# Patient Record
Sex: MALE | Race: OTHER | Employment: FULL TIME | URBAN - METROPOLITAN AREA
[De-identification: names, ages, dates, MRNs, and addresses within clinical notes are randomized per-mention and may not be internally consistent; named-entity substitution may affect disease eponyms.]

---

## 2021-12-16 ENCOUNTER — OFFICE VISIT (OUTPATIENT)
Dept: FAMILY MEDICINE CLINIC | Age: 41
End: 2021-12-16
Payer: COMMERCIAL

## 2021-12-16 VITALS
OXYGEN SATURATION: 99 % | DIASTOLIC BLOOD PRESSURE: 72 MMHG | HEART RATE: 75 BPM | WEIGHT: 189.38 LBS | SYSTOLIC BLOOD PRESSURE: 120 MMHG | RESPIRATION RATE: 18 BRPM | TEMPERATURE: 98.6 F | BODY MASS INDEX: 28.05 KG/M2 | HEIGHT: 69 IN

## 2021-12-16 DIAGNOSIS — Z11.59 ENCOUNTER FOR HEPATITIS C SCREENING TEST FOR LOW RISK PATIENT: ICD-10-CM

## 2021-12-16 DIAGNOSIS — Z13.29 SCREENING FOR THYROID DISORDER: ICD-10-CM

## 2021-12-16 DIAGNOSIS — Z13.6 ENCOUNTER FOR LIPID SCREENING FOR CARDIOVASCULAR DISEASE: ICD-10-CM

## 2021-12-16 DIAGNOSIS — Z13.220 ENCOUNTER FOR LIPID SCREENING FOR CARDIOVASCULAR DISEASE: ICD-10-CM

## 2021-12-16 DIAGNOSIS — R43.1 ABNORMAL SMELL: Primary | ICD-10-CM

## 2021-12-16 DIAGNOSIS — H57.89 IRRITATION OF BOTH EYES: ICD-10-CM

## 2021-12-16 PROCEDURE — 99204 OFFICE O/P NEW MOD 45 MIN: CPT | Performed by: STUDENT IN AN ORGANIZED HEALTH CARE EDUCATION/TRAINING PROGRAM

## 2021-12-16 NOTE — PROGRESS NOTES
Orlando Salgado, 46 y.o.,  male  Chief Complaint   Patient presents with    New Patient    Other      wants to phanttosmia has concerns about smelling like smoke (patient does not smoke, does not hang around people that smoke)        SUBJECTIVE  History of present illness  1. Abnormal smell  Occasionally smells smoky odor x for 1 year. No significant past medical history. Denies nasal and paranasal sinus disease, head or brain injury, endocrine disorders (hypothyroidism, diabetes mellitus), central nervous system disorders. Works as a . Reports excessive daily use of antiseptic air spray (Lysol) during COVID-19 pandemic. 2. Eyes irritation   Dry eyes and eye irritation for 1 year. Has tried nothing. ROS:  Pertinent items are noted in HPI    OBJECTIVE    Physical Exam:     Visit Vitals  /72 (BP 1 Location: Left arm, BP Patient Position: Sitting, BP Cuff Size: Large adult)   Pulse 75   Temp 98.6 °F (37 °C) (Temporal)   Resp 18   Ht 5' 9\" (1.753 m)   Wt 189 lb 6 oz (85.9 kg)   SpO2 99%   BMI 27.97 kg/m²       General: alert, well-appearing, in no apparent distress or pain  Head: atraumatic.  Non-tender maxillary and frontal sinuses  Eyes: Lids with no discharge, no matting, conjunctivae clear and non injected, full EOMs, PERLLA  Ears: pinna non-tender, external auditory canal patent, TM intact  Mouth/throat: teeth, gums, palate normal appearing, moist oral mucosa, tonsils non enlarged, pharynx non erythematous and no lesion  Neck: supple, no JVD , no carotid bruit, no adenopathy palpated  CVS: normal rate, regular rhythm, distinct S1 and S2, no murmurs, rubs clicks or gallops  Lungs: Breathing not labored, good chest excursion, clear to ausculation bilaterally, no crackles, wheezing or rhonchi noted  Abdomen: normoactive bowel sounds, soft, non-tender, no organomegaly  Extremities: no edema, no cyanosis,  Skin: warm, no lesions, rashes noted  Psych: alert and oriented x3, mood, insight, speech and affect normal    ASSESSMENT/PLAN  Diagnoses and all orders for this visit:    1. Abnormal smell  -     METABOLIC PANEL, COMPREHENSIVE; Future  -     CBC WITH AUTOMATED DIFF; Future  Etiology unclear. No abnormal findings on physical examination. No significant past medical history. Labs ordered. Consider rule out endocrine disorders (hypothyroidism, diabetes mellitus). Can be related to chemical exposure (excessive use of air spray). Patient advised to reduce use of Lysol spray for 1 month and monitor symptoms. 2. Screening for thyroid disorder  -     TSH 3RD GENERATION; Future  3. Encounter for lipid screening for cardiovascular disease  -     LIPID PANEL; Future  4. Encounter for hepatitis C screening test for low risk patient  -     HEPATITIS C AB; Future  5. Irritation of both eyes  No abnormal findings on physical examination. OTC artificial tears recommended. Follow-up and Dispositions    · Return in about 6 weeks (around 1/27/2022) for routine care.

## 2021-12-16 NOTE — PATIENT INSTRUCTIONS
Comprehensive Metabolic Panel: About This Test  What is it? This blood test measures your sugar (glucose) level and electrolyte and fluid balance. It also measures kidney and liver function. Why is this test done? Your doctor may order this test as part of a regular exam. The doctor may use this test to check on a condition such as high blood pressure. It also can help diagnose problems such as diabetes. How do you prepare for the test?  Your doctor may ask you not to eat or drink anything other than water for at least 8 hours before the test.  How is the test done? A health professional uses a needle to take a blood sample, usually from the arm. How long does the test take? The test will take a few minutes. What happens after the test?  · You will probably be able to go home right away. · You can go back to your usual activities right away. Follow-up care is a key part of your treatment and safety. Be sure to make and go to all appointments, and call your doctor if you are having problems. It's also a good idea to keep a list of the medicines you take. Ask your doctor when you can expect to have your test results. Current as of: June 17, 2021               Content Version: 13.0  © 2006-2021 Healthwise, Incorporated. Care instructions adapted under license by [employer] (which disclaims liability or warranty for this information). If you have questions about a medical condition or this instruction, always ask your healthcare professional. [place] [address] any warranty or liability for your use of this information. A Healthy Lifestyle: Care Instructions  Your Care Instructions     A healthy lifestyle can help you feel good, stay at a healthy weight, and have plenty of energy for both work and play. A healthy lifestyle is something you can share with your whole family.   A healthy lifestyle also can lower your risk for serious health problems, such as high blood pressure, heart disease, and diabetes. You can follow a few steps listed below to improve your health and the health of your family. Follow-up care is a key part of your treatment and safety. Be sure to make and go to all appointments, and call your doctor if you are having problems. It's also a good idea to know your test results and keep a list of the medicines you take. How can you care for yourself at home? · Do not eat too much sugar, fat, or fast foods. You can still have dessert and treats now and then. The goal is moderation. · Start small to improve your eating habits. Pay attention to portion sizes, drink less juice and soda pop, and eat more fruits and vegetables. ? Eat a healthy amount of food. A 3-ounce serving of meat, for example, is about the size of a deck of cards. Fill the rest of your plate with vegetables and whole grains. ? Limit the amount of soda and sports drinks you have every day. Drink more water when you are thirsty. ? Eat plenty of fruits and vegetables every day. Have an apple or some carrot sticks as an afternoon snack instead of a candy bar. Try to have fruits and/or vegetables at every meal.  · Make exercise part of your daily routine. You may want to start with simple activities, such as walking, bicycling, or slow swimming. Try to be active 30 to 60 minutes every day. You do not need to do all 30 to 60 minutes all at once. For example, you can exercise 3 times a day for 10 or 20 minutes. Moderate exercise is safe for most people, but it is always a good idea to talk to your doctor before starting an exercise program.  · Keep moving. [facility] the lawn, work in the garden, or [employer]. Take the stairs instead of the elevator at work. · If you smoke, quit. People who smoke have an increased risk for heart attack, stroke, cancer, and other lung illnesses. Quitting is hard, but there are ways to boost your chance of quitting tobacco for good. ?  Use nicotine gum, patches, or lozenges. ? Ask your doctor about stop-smoking programs and medicines. ? Keep trying. In addition to reducing your risk of diseases in the future, you will notice some benefits soon after you stop using tobacco. If you have shortness of breath or asthma symptoms, they will likely get better within a few weeks after you quit. · Limit how much alcohol you drink. Moderate amounts of alcohol (up to 2 drinks a day for men, 1 drink a day for women) are okay. But drinking too much can lead to liver problems, high blood pressure, and other health problems. Family health  If you have a family, there are many things you can do together to improve your health. · Eat meals together as a family as often as possible. · Eat healthy foods. This includes fruits, vegetables, lean meats and dairy, and whole grains. · Include your family in your fitness plan. Most people think of activities such as jogging or tennis as the way to fitness, but there are many ways you and your family can be more active. Anything that makes you breathe hard and gets your heart pumping is exercise. Here are some tips:  ? Walk to do errands or to take your child to school or the bus.  ? Go for a family bike ride after dinner instead of watching TV. Where can you learn more? Go to http://www.gray.com/  Enter G417 in the search box to learn more about \"A Healthy Lifestyle: Care Instructions. \"  Current as of: June 16, 2021               Content Version: 13.0  © 4975-9957 Healthwise, [employer]. Care instructions adapted under license by [employer] (which disclaims liability or warranty for this information). If you have questions about a medical condition or this instruction, always ask your healthcare professional. [place] [address] any warranty or liability for your use of this information.

## 2022-02-02 ENCOUNTER — HOSPITAL ENCOUNTER (OUTPATIENT)
Dept: LAB | Age: 42
Discharge: HOME | End: 2022-02-02

## 2022-02-02 LAB — XX-LABCORP SPECIMEN COL,LCBCF: NORMAL

## 2022-02-02 PROCEDURE — 99001 SPECIMEN HANDLING PT-LAB: CPT

## 2022-02-03 LAB
ALBUMIN SERPL-MCNC: 4.2 G/DL (ref 4–5)
ALBUMIN/GLOB SERPL: 1.6 {RATIO} (ref 1.2–2.2)
ALP SERPL-CCNC: 68 IU/L (ref 44–121)
ALT SERPL-CCNC: 16 IU/L (ref 0–44)
AST SERPL-CCNC: 16 IU/L (ref 0–40)
BASOPHILS # BLD AUTO: 0 X10E3/UL (ref 0–0.2)
BASOPHILS NFR BLD AUTO: 1 %
BILIRUB SERPL-MCNC: 0.2 MG/DL (ref 0–1.2)
BUN SERPL-MCNC: 10 MG/DL (ref 6–20)
BUN/CREAT SERPL: 9 (ref 9–20)
CALCIUM SERPL-MCNC: 9.4 MG/DL (ref 8.7–10.2)
CHLORIDE SERPL-SCNC: 104 MMOL/L (ref 96–106)
CHOLEST SERPL-MCNC: 205 MG/DL (ref 100–199)
CO2 SERPL-SCNC: 22 MMOL/L (ref 20–29)
CREAT SERPL-MCNC: 1.1 MG/DL (ref 0.76–1.27)
EOSINOPHIL # BLD AUTO: 0.1 X10E3/UL (ref 0–0.4)
EOSINOPHIL NFR BLD AUTO: 2 %
ERYTHROCYTE [DISTWIDTH] IN BLOOD BY AUTOMATED COUNT: 12.4 % (ref 11.6–15.4)
GLOBULIN SER CALC-MCNC: 2.7 G/DL (ref 1.5–4.5)
GLUCOSE SERPL-MCNC: 95 MG/DL (ref 65–99)
HCT VFR BLD AUTO: 45.1 % (ref 37.5–51)
HCV AB S/CO SERPL IA: <0.1 S/CO RATIO (ref 0–0.9)
HDLC SERPL-MCNC: 52 MG/DL
HGB BLD-MCNC: 15.1 G/DL (ref 13–17.7)
IMM GRANULOCYTES # BLD AUTO: 0 X10E3/UL (ref 0–0.1)
IMM GRANULOCYTES NFR BLD AUTO: 0 %
IMP & REVIEW OF LAB RESULTS: NORMAL
LDLC SERPL CALC-MCNC: 141 MG/DL (ref 0–99)
LYMPHOCYTES # BLD AUTO: 2.4 X10E3/UL (ref 0.7–3.1)
LYMPHOCYTES NFR BLD AUTO: 46 %
MCH RBC QN AUTO: 29.8 PG (ref 26.6–33)
MCHC RBC AUTO-ENTMCNC: 33.5 G/DL (ref 31.5–35.7)
MCV RBC AUTO: 89 FL (ref 79–97)
MONOCYTES # BLD AUTO: 0.4 X10E3/UL (ref 0.1–0.9)
MONOCYTES NFR BLD AUTO: 8 %
NEUTROPHILS # BLD AUTO: 2.3 X10E3/UL (ref 1.4–7)
NEUTROPHILS NFR BLD AUTO: 43 %
PLATELET # BLD AUTO: 213 X10E3/UL (ref 150–450)
POTASSIUM SERPL-SCNC: 4.1 MMOL/L (ref 3.5–5.2)
PROT SERPL-MCNC: 6.9 G/DL (ref 6–8.5)
RBC # BLD AUTO: 5.06 X10E6/UL (ref 4.14–5.8)
SODIUM SERPL-SCNC: 142 MMOL/L (ref 134–144)
TRIGL SERPL-MCNC: 65 MG/DL (ref 0–149)
TSH SERPL DL<=0.005 MIU/L-ACNC: 2.03 UIU/ML (ref 0.45–4.5)
VLDLC SERPL CALC-MCNC: 12 MG/DL (ref 5–40)
WBC # BLD AUTO: 5.2 X10E3/UL (ref 3.4–10.8)

## 2022-02-08 ENCOUNTER — OFFICE VISIT (OUTPATIENT)
Dept: FAMILY MEDICINE CLINIC | Age: 42
End: 2022-02-08
Payer: COMMERCIAL

## 2022-02-08 VITALS
RESPIRATION RATE: 16 BRPM | WEIGHT: 189 LBS | OXYGEN SATURATION: 98 % | TEMPERATURE: 97.8 F | SYSTOLIC BLOOD PRESSURE: 122 MMHG | DIASTOLIC BLOOD PRESSURE: 72 MMHG | BODY MASS INDEX: 27.99 KG/M2 | HEIGHT: 69 IN | HEART RATE: 73 BPM

## 2022-02-08 DIAGNOSIS — E78.00 HYPERCHOLESTEREMIA: ICD-10-CM

## 2022-02-08 DIAGNOSIS — Z00.00 ROUTINE MEDICAL EXAM: Primary | ICD-10-CM

## 2022-02-08 PROCEDURE — 99395 PREV VISIT EST AGE 18-39: CPT | Performed by: STUDENT IN AN ORGANIZED HEALTH CARE EDUCATION/TRAINING PROGRAM

## 2022-02-08 NOTE — PATIENT INSTRUCTIONS
Learning About High Cholesterol  What is high cholesterol? High cholesterol means that you have too much cholesterol in your blood. Cholesterol is a type of fat. It's needed for many body functions, such as making new cells. Cholesterol is made by your body. It also comes from food you eat. Having high cholesterol can lead to the buildup of plaque in artery walls. This can increase your risk of heart attack and stroke. When your doctor talks about high cholesterol levels, your doctor is talking about your total cholesterol and LDL cholesterol (the \"bad\" cholesterol) levels. Your doctor may also speak about HDL (the \"good\" cholesterol) levels. High HDL is linked with a lower risk for coronary artery disease, heart attack, and stroke. Your cholesterol levels help your doctor find out your risk for having a heart attack or stroke. How can you help prevent high cholesterol? A heart-healthy lifestyle can help you prevent high cholesterol and lower your risk for a heart attack and stroke. · Eat heart-healthy foods. ? Eat fruits, vegetables, whole grains, beans, and other high-fiber foods. ? Eat lean proteins, such as seafood, lean meats, beans, nuts, and soy products. ? Eat healthy fats, such as canola and olive oil. ? Choose foods that are low in saturated fat. ? Limit sodium and alcohol. ? Limit drinks and foods with added sugar. · Be active. Try to do moderate activity at least 2½ hours a week. Or try vigorous activity at least 1¼ hours a week. You may want to walk or try other activities, such as running, swimming, cycling, or playing tennis or team sports. · Stay at a healthy weight. Lose weight if you need to. · Don't smoke. If you need help quitting, talk to your doctor about stop-smoking programs and medicines. These can increase your chances of quitting for good. How is high cholesterol treated? The goal of treatment is to reduce your chances of having a heart attack or stroke.  The goal is not to lower your cholesterol numbers only. · Have a heart-healthy lifestyle. This includes eating healthy foods, not smoking, losing weight, and being more active. · You may choose to take medicine. Follow-up care is a key part of your treatment and safety. Be sure to make and go to all appointments, and call your doctor if you are having problems. It's also a good idea to know your test results and keep a list of the medicines you take. Where can you learn more? Go to http://www.jordan.com/  Enter Q621 in the search box to learn more about \"Learning About High Cholesterol. \"  Current as of: April 29, 2021               Content Version: 13.0  © 2006-2021 [employer]. Care instructions adapted under license by [employer] (which disclaims liability or warranty for this information). If you have questions about a medical condition or this instruction, always ask your healthcare professional. [address] any warranty or liability for your use of this information     Well Visit, Ages 25 to 48: Care Instructions  Overview     Well visits can help you stay healthy. Your doctor has checked your overall health and may have suggested ways to take good care of yourself. Your doctor also may have recommended tests. At home, you can help prevent illness with healthy eating, regular exercise, and other steps. Follow-up care is a key part of your treatment and safety. Be sure to make and go to all appointments, and call your doctor if you are having problems. It's also a good idea to know your test results and keep a list of the medicines you take. How can you care for yourself at home? · Get screening tests that you and your doctor decide on. Screening helps find diseases before any symptoms appear. · Eat healthy foods. Choose fruits, vegetables, whole grains, protein, and low-fat dairy foods. Limit fat, especially saturated fat.  Reduce salt in your diet. · Limit alcohol. If you are a man, have no more than 2 drinks a day or 14 drinks a week. If you are a woman, have no more than 1 drink a day or 7 drinks a week. · Get at least 30 minutes of physical activity on most days of the week. Walking is a good choice. You also may want to do other activities, such as running, swimming, cycling, or playing tennis or team sports. Discuss any changes in your exercise program with your doctor. · Reach and stay at a healthy weight. This will lower your risk for many problems, such as obesity, diabetes, heart disease, and high blood pressure. · Do not smoke or allow others to smoke around you. If you need help quitting, talk to your doctor about stop-smoking programs and medicines. These can increase your chances of quitting for good. · Care for your mental health. It is easy to get weighed down by worry and stress. Learn strategies to manage stress, like deep breathing and mindfulness, and stay connected with your family and community. If you find you often feel sad or hopeless, talk with your doctor. Treatment can help. · Talk to your doctor about whether you have any risk factors for sexually transmitted infections (STIs). You can help prevent STIs if you wait to have sex with a new partner (or partners) until you've each been tested for STIs. It also helps if you use condoms (male or female condoms) and if you limit your sex partners to one person who only has sex with you. Vaccines are available for some STIs, such as HPV. · Use birth control if it's important to you to prevent pregnancy. Talk with your doctor about the choices available and what might be best for you. · If you think you may have a problem with alcohol or drug use, talk to your doctor. This includes prescription medicines (such as amphetamines and opioids) and illegal drugs (such as cocaine and methamphetamine).  Your doctor can help you figure out what type of treatment is best for you.  · Protect your skin from too much sun. When you're outdoors from 10 a.m. to 4 p.m., stay in the shade or cover up with clothing and a hat with a wide brim. Wear sunglasses that block UV rays. Even when it's cloudy, put broad-spectrum sunscreen (SPF 30 or higher) on any exposed skin. · See a dentist one or two times a year for checkups and to have your teeth cleaned. · Wear a seat belt in the car. When should you call for help? Watch closely for changes in your health, and be sure to contact your doctor if you have any problems or symptoms that concern you. Where can you learn more? Go to http://www.Likeability.com/  Enter P072 in the search box to learn more about \"Well Visit, Ages 25 to 48: Care Instructions. \"  Current as of: February 11, 2021               Content Version: 13.0  © 9337-0961 Healthwise, Incorporated. Care instructions adapted under license by [employer] (which disclaims liability or warranty for this information). If you have questions about a medical condition or this instruction, always ask your healthcare professional. [address] any warranty or liability for your use of this information.

## 2022-02-08 NOTE — PROGRESS NOTES
Chief Complaint   Patient presents with    Results     review of results     Physical     1. \"Have you been to the ER, urgent care clinic since your last visit? Hospitalized since your last visit? \" No    2. \"Have you seen or consulted any other health care providers outside of the [address] [place] [address] since your last visit? \" No     3. For patients aged 39-70: Has the patient had a colonoscopy / FIT/ Cologuard? NA - based on age      Health Maintenance Due   Topic Date Due    COVID-19 Vaccine (1) Never done    DTaP/Tdap/Td series (1 - Tdap) Never done    Flu Vaccine (1) Never done     Declined Tdap at today's physical appointment.

## 2022-02-08 NOTE — PROGRESS NOTES
Subjective: Tello Huddleston is a 46 y.o. male presenting for his annual checkup. Covid vaccination completed, but overdue for booster dose. ROS:  Feeling well. No dyspnea or chest pain on exertion. No abdominal pain, change in bowel habits, black or bloody stools. No urinary tract or prostatic symptoms. No neurological complaints. Specific concerns today: None. Parosmia resolved after he ceased using Lysol spray. Patient Active Problem List   Diagnosis Code    Hypercholesteremia E78.00     Patient Active Problem List    Diagnosis Date Noted    Hypercholesteremia 02/08/2022       No Known Allergies  History reviewed. No pertinent past medical history.   Past Surgical History:   Procedure Laterality Date    HX [facility]CT[facility]01/[facility],      Family History   Problem Relation Age of Onset    Cancer Neg Hx     Diabetes Neg Hx     Heart Disease Neg Hx     Heart Attack Neg Hx     Hypertension Neg Hx     Stroke Neg Hx      Social History     Tobacco Use    Smoking status: Never Smoker    Smokeless tobacco: Never Used   Substance Use Topics    Alcohol use: No        Lab Results   Component Value Date/Time    WBC 5.2 02/02/2022 12:00 AM    HGB 15.1 02/02/2022 12:00 AM    HCT 45.1 02/02/2022 12:00 AM    PLATELET 229 11/98/5544 12:00 AM    MCV 89 02/02/2022 12:00 AM     Lab Results   Component Value Date/Time    Glucose 95 02/02/2022 12:00 AM    LDL, calculated 141 (H) 02/02/2022 12:00 AM    Creatinine 1.10 02/02/2022 12:00 AM      Lab Results   Component Value Date/Time    Cholesterol, total 205 (H) 02/02/2022 12:00 AM    HDL Cholesterol 52 02/02/2022 12:00 AM    LDL, calculated 141 (H) 02/02/2022 12:00 AM    Triglyceride 65 02/02/2022 12:00 AM     Lab Results   Component Value Date/Time    TSH 2.030 02/02/2022 12:00 AM      Lab Results   Component Value Date/Time    Sodium 142 02/02/2022 12:00 AM    Potassium 4.1 02/02/2022 12:00 AM    Chloride 104 02/02/2022 12:00 AM    CO2 22 02/02/2022 12:00 AM    Anion gap 4 (L) 06/07/2018 07:00 AM    Glucose 95 02/02/2022 12:00 AM    BUN 10 02/02/2022 12:00 AM    Creatinine 1.10 02/02/2022 12:00 AM    BUN/Creatinine ratio 9 02/02/2022 12:00 AM    GFR est AA 97 02/02/2022 12:00 AM    GFR est non-AA 84 02/02/2022 12:00 AM    Calcium 9.4 02/02/2022 12:00 AM    Bilirubin, total 0.2 02/02/2022 12:00 AM    ALT (SGPT) 16 02/02/2022 12:00 AM    Alk. phosphatase 68 02/02/2022 12:00 AM    Protein, total 6.9 02/02/2022 12:00 AM    Albumin 4.2 02/02/2022 12:00 AM    A-G Ratio 1.6 02/02/2022 12:00 AM         Objective:     Visit Vitals  /72 (BP 1 Location: Left arm, BP Patient Position: Sitting, BP Cuff Size: Large adult)   Pulse 73   Temp 97.8 °F (36.6 °C) (Temporal)   Resp 16   Ht 5' 9\" (1.753 m)   Wt 189 lb (85.7 kg)   SpO2 98%   BMI 27.91 kg/m²     The patient appears well, alert, oriented x 3, in no distress. ENT normal.  Neck supple. No adenopathy or thyromegaly. JESSICA. Lungs are clear, good air entry, no wheezes, rhonchi or rales. S1 and S2 normal, no murmurs, regular rate and rhythm. Abdomen is soft without tenderness, guarding, mass or organomegaly.  exam: deferred. Extremities show no edema, normal peripheral pulses. Neurological is normal without focal findings. Assessment/Plan:       ICD-10-CM ICD-9-CM    1. Routine medical exam  Z00.00 V70.0 Healthy adult male  Lose weight, low fat diet, increase physical activity. Covid-19 booster dose recommended. Call if any problems   2. Hypercholesteremia  E78.00 272.0 Lab results reviewed with the patient in detail. Elevated total and LDL cholesterol noted. Current 10-year cardiovascular risk was estimated using ASCVD Plus Frank for default age 36: 2.8% (low). Lifestyle modifications recommended and discussed. Handout given.      Follow-up and Dispositions    · Return in about 1 year (around 2/8/2023), or if symptoms worsen or fail to improve, for annual physical exam.       Medical dictation software was used for portions of this report. Unintended voice recognition errors may occur.